# Patient Record
Sex: MALE | Race: WHITE | NOT HISPANIC OR LATINO
[De-identification: names, ages, dates, MRNs, and addresses within clinical notes are randomized per-mention and may not be internally consistent; named-entity substitution may affect disease eponyms.]

---

## 2017-01-10 ENCOUNTER — APPOINTMENT (OUTPATIENT)
Dept: SURGERY | Facility: CLINIC | Age: 52
End: 2017-01-10

## 2017-01-10 VITALS
HEIGHT: 67 IN | DIASTOLIC BLOOD PRESSURE: 87 MMHG | BODY MASS INDEX: 43.47 KG/M2 | WEIGHT: 277 LBS | SYSTOLIC BLOOD PRESSURE: 144 MMHG

## 2017-01-10 DIAGNOSIS — Z78.9 OTHER SPECIFIED HEALTH STATUS: ICD-10-CM

## 2017-02-14 ENCOUNTER — APPOINTMENT (OUTPATIENT)
Dept: GASTROENTEROLOGY | Facility: CLINIC | Age: 52
End: 2017-02-14

## 2017-02-14 VITALS
WEIGHT: 271 LBS | HEIGHT: 67 IN | DIASTOLIC BLOOD PRESSURE: 76 MMHG | OXYGEN SATURATION: 97 % | BODY MASS INDEX: 42.53 KG/M2 | HEART RATE: 75 BPM | SYSTOLIC BLOOD PRESSURE: 112 MMHG

## 2017-04-21 ENCOUNTER — APPOINTMENT (OUTPATIENT)
Dept: GASTROENTEROLOGY | Facility: HOSPITAL | Age: 52
End: 2017-04-21

## 2017-08-29 ENCOUNTER — APPOINTMENT (OUTPATIENT)
Dept: GASTROENTEROLOGY | Facility: CLINIC | Age: 52
End: 2017-08-29

## 2018-01-12 ENCOUNTER — APPOINTMENT (OUTPATIENT)
Dept: GASTROENTEROLOGY | Facility: CLINIC | Age: 53
End: 2018-01-12
Payer: COMMERCIAL

## 2018-01-12 VITALS
WEIGHT: 268 LBS | SYSTOLIC BLOOD PRESSURE: 130 MMHG | TEMPERATURE: 98.2 F | HEIGHT: 67 IN | DIASTOLIC BLOOD PRESSURE: 80 MMHG | RESPIRATION RATE: 97 BRPM | HEART RATE: 64 BPM | BODY MASS INDEX: 42.06 KG/M2

## 2018-01-12 DIAGNOSIS — R10.12 LEFT UPPER QUADRANT PAIN: ICD-10-CM

## 2018-01-12 DIAGNOSIS — R13.10 DYSPHAGIA, UNSPECIFIED: ICD-10-CM

## 2018-01-12 DIAGNOSIS — M62.08 SEPARATION OF MUSCLE (NONTRAUMATIC), OTHER SITE: ICD-10-CM

## 2018-01-12 PROCEDURE — 99214 OFFICE O/P EST MOD 30 MIN: CPT

## 2018-01-12 RX ORDER — METOPROLOL SUCCINATE 25 MG/1
25 TABLET, EXTENDED RELEASE ORAL
Qty: 30 | Refills: 0 | Status: DISCONTINUED | COMMUNITY
Start: 2016-12-28 | End: 2018-01-12

## 2018-01-25 ENCOUNTER — TRANSCRIPTION ENCOUNTER (OUTPATIENT)
Age: 53
End: 2018-01-25

## 2018-01-25 ENCOUNTER — RESULT REVIEW (OUTPATIENT)
Age: 53
End: 2018-01-25

## 2018-01-25 ENCOUNTER — OUTPATIENT (OUTPATIENT)
Dept: OUTPATIENT SERVICES | Facility: HOSPITAL | Age: 53
LOS: 1 days | End: 2018-01-25
Payer: COMMERCIAL

## 2018-01-25 ENCOUNTER — APPOINTMENT (OUTPATIENT)
Dept: GASTROENTEROLOGY | Facility: HOSPITAL | Age: 53
End: 2018-01-25

## 2018-01-25 DIAGNOSIS — Z98.89 OTHER SPECIFIED POSTPROCEDURAL STATES: Chronic | ICD-10-CM

## 2018-01-25 DIAGNOSIS — Z90.49 ACQUIRED ABSENCE OF OTHER SPECIFIED PARTS OF DIGESTIVE TRACT: Chronic | ICD-10-CM

## 2018-01-25 DIAGNOSIS — R10.12 LEFT UPPER QUADRANT PAIN: ICD-10-CM

## 2018-01-25 DIAGNOSIS — R13.10 DYSPHAGIA, UNSPECIFIED: ICD-10-CM

## 2018-01-25 DIAGNOSIS — K29.70 GASTRITIS, UNSPECIFIED, W/OUT BLEEDING: ICD-10-CM

## 2018-01-25 DIAGNOSIS — Z96.641 PRESENCE OF RIGHT ARTIFICIAL HIP JOINT: Chronic | ICD-10-CM

## 2018-01-25 PROCEDURE — 43239 EGD BIOPSY SINGLE/MULTIPLE: CPT

## 2018-01-25 PROCEDURE — 88312 SPECIAL STAINS GROUP 1: CPT

## 2018-01-25 PROCEDURE — 88313 SPECIAL STAINS GROUP 2: CPT | Mod: 26

## 2018-01-25 PROCEDURE — 88313 SPECIAL STAINS GROUP 2: CPT

## 2018-01-25 PROCEDURE — 88305 TISSUE EXAM BY PATHOLOGIST: CPT | Mod: 26

## 2018-01-25 PROCEDURE — 88305 TISSUE EXAM BY PATHOLOGIST: CPT

## 2018-01-25 PROCEDURE — 88312 SPECIAL STAINS GROUP 1: CPT | Mod: 26

## 2018-03-31 ENCOUNTER — TRANSCRIPTION ENCOUNTER (OUTPATIENT)
Age: 53
End: 2018-03-31

## 2019-12-26 ENCOUNTER — TRANSCRIPTION ENCOUNTER (OUTPATIENT)
Age: 54
End: 2019-12-26

## 2020-01-14 ENCOUNTER — APPOINTMENT (OUTPATIENT)
Dept: GASTROENTEROLOGY | Facility: CLINIC | Age: 55
End: 2020-01-14
Payer: COMMERCIAL

## 2020-01-14 DIAGNOSIS — K26.9 DUODENAL ULCER, UNSPECIFIED AS ACUTE OR CHRONIC, W/OUT HEMORRHAGE OR PERFORATION: ICD-10-CM

## 2020-01-14 DIAGNOSIS — Z79.1 LONG TERM (CURRENT) USE OF NON-STEROIDAL ANTI-INFLAMMATORIES (NSAID): ICD-10-CM

## 2020-01-14 PROCEDURE — 99214 OFFICE O/P EST MOD 30 MIN: CPT

## 2020-01-14 NOTE — PHYSICAL EXAM
[General Appearance - Alert] : alert [General Appearance - In No Acute Distress] : in no acute distress [Sclera] : the sclera and conjunctiva were normal [Extraocular Movements] : extraocular movements were intact [Outer Ear] : the ears and nose were normal in appearance [Neck Appearance] : the appearance of the neck was normal [Hearing Threshold Finger Rub Not [place]] : hearing was normal [Neck Cervical Mass (___cm)] : no neck mass was observed [Auscultation Breath Sounds / Voice Sounds] : lungs were clear to auscultation bilaterally [Heart Sounds] : normal S1 and S2 [Heart Rate And Rhythm] : heart rate was normal and rhythm regular [Heart Sounds Gallop] : no gallops [Heart Sounds Pericardial Friction Rub] : no pericardial rub [Murmurs] : no murmurs [Bowel Sounds] : normal bowel sounds [Abdomen Soft] : soft [Abdomen Tenderness] : non-tender [Abdomen Mass (___ Cm)] : no abdominal mass palpated [Abnormal Walk] : normal gait [Nail Clubbing] : no clubbing  or cyanosis of the fingernails [] : no rash [Skin Color & Pigmentation] : normal skin color and pigmentation [Oriented To Time, Place, And Person] : oriented to person, place, and time [Affect] : the affect was normal [Impaired Insight] : insight and judgment were intact

## 2020-01-15 PROBLEM — K26.9 DUODENAL ULCER: Status: ACTIVE | Noted: 2020-01-15

## 2020-01-15 PROBLEM — Z79.1 NSAID LONG-TERM USE: Status: ACTIVE | Noted: 2020-01-15

## 2020-01-15 NOTE — HISTORY OF PRESENT ILLNESS
[de-identified] : 53 y/o man with hx of obesity, HTN, hyperlipidemia who presents for follow up. \par \par He reports because of stress at work, he is having more heartburn.  This has been occurring maybe twice or 3 times a week.  He has been taking NSAIDs for toe pain.  He decided to change his eating habits and this has helped him.\par \par He denies any other symptoms such as nausea, vomiting, dysphagia, odynophagia, loss of appetite, abnormal weight loss, constipation, diarrhea, melena and hematochezia.\par \par He is due for a colonoscopy.\par \par \par \par From prior notes: \par In November 2016 patient had a colonoscopy and was found to have a diminutive polyp in the rectum which was removed, 7 mm polyp in the sigmoid colon which was removed, 1 cm semi-pedunculated polyp in left colon which was removed by hot snare polypectomy, 7 mm polyp in the transverse colon which was removed.  Throughout the colon there was moderate diverticulosis.\par Left colon polyp was a sessile serrated adenoma. He was reported to have another colonoscopy in 3 years.\par \par \par \par From Jan 2018 visit: \par The patient is concerned about a "bulge" that he experiences in the left upper quadrant area. He says when he coughs he notices a "bulging" in LUQ that can be very painful to him. At one point it was "black and blue" in color. He has recently seen a surgeon who told him he does not have a hernia however he wants a second opinion.\par \par He continues to report having trouble swallowing mainly to breads but says it is getting better.\par \par He has not had an upper endoscopy is recommended previously. \par  \par \par On January 2018 the patient had an upper endoscopy. He was found to have a normal squamocolumnar junction status post biopsy, moderate erythema in the antrum and body of the stomach this was biopsied and  mild erythema in the duodenum with thickened folds status post biopsy. Duodenum biopsies showed acute and chronic peptic ulcer. Gastric biopsies were unremarkable. No evidence of H. pylori bacteria. GE junction biopsies revealed mild inflammation negative for intestinal metaplasia.

## 2020-01-15 NOTE — ASSESSMENT
[FreeTextEntry1] : 55 y/o man with hx of obesity, HTN, hyperlipidemia who with reflux.   Patient is due for a colonoscopy since he has a history of colon polyps.  Risks such as perforation requiring surgery, bleeding, infection, diverticulitis, colitis, missed colon cancer (2% to 6%), internal organ injury, etc, risks of bowel prep including colitis, syncope, adverse reaction to medication etc. and risks of anesthesia including cardiopulmonary compromise were discussed with patient.  Patient verbalized understanding and agrees to proceed with the planned procedure.\par \par Patient was found to have an ulcer in the duodenum in 2018.  He's been having more reflux symptoms recently he would like to have another endoscopy at the time of his colonoscopy to assess his upper digestive tract. We can do this for him to rule out Howell's esophagus, ulcers etc.   Risks, benefits, and alternatives of the procedure were discussed with the patient. Patient understands and agrees to proceed with the planned procedure.\par \par He will need cardiology clearance first.

## 2020-09-15 ENCOUNTER — TRANSCRIPTION ENCOUNTER (OUTPATIENT)
Age: 55
End: 2020-09-15

## 2020-10-02 ENCOUNTER — APPOINTMENT (OUTPATIENT)
Dept: GASTROENTEROLOGY | Facility: CLINIC | Age: 55
End: 2020-10-02
Payer: COMMERCIAL

## 2020-10-02 VITALS
SYSTOLIC BLOOD PRESSURE: 112 MMHG | WEIGHT: 265 LBS | OXYGEN SATURATION: 96 % | HEART RATE: 66 BPM | BODY MASS INDEX: 41.59 KG/M2 | DIASTOLIC BLOOD PRESSURE: 81 MMHG | HEIGHT: 67 IN

## 2020-10-02 PROCEDURE — 99214 OFFICE O/P EST MOD 30 MIN: CPT

## 2020-10-02 NOTE — PHYSICAL EXAM
[General Appearance - Alert] : alert [General Appearance - In No Acute Distress] : in no acute distress [Sclera] : the sclera and conjunctiva were normal [Extraocular Movements] : extraocular movements were intact [Outer Ear] : the ears and nose were normal in appearance [Hearing Threshold Finger Rub Not [place]] : hearing was normal [Neck Appearance] : the appearance of the neck was normal [Neck Cervical Mass (___cm)] : no neck mass was observed [Auscultation Breath Sounds / Voice Sounds] : lungs were clear to auscultation bilaterally [Heart Rate And Rhythm] : heart rate was normal and rhythm regular [Heart Sounds] : normal S1 and S2 [Heart Sounds Gallop] : no gallops [Murmurs] : no murmurs [Heart Sounds Pericardial Friction Rub] : no pericardial rub [Bowel Sounds] : normal bowel sounds [Abdomen Soft] : soft [Abdomen Tenderness] : non-tender [Abdomen Mass (___ Cm)] : no abdominal mass palpated [Cervical Lymph Nodes Enlarged Posterior Bilaterally] : posterior cervical [Cervical Lymph Nodes Enlarged Anterior Bilaterally] : anterior cervical [Supraclavicular Lymph Nodes Enlarged Bilaterally] : supraclavicular [Abnormal Walk] : normal gait [Nail Clubbing] : no clubbing  or cyanosis of the fingernails [Skin Color & Pigmentation] : normal skin color and pigmentation [] : no rash [Oriented To Time, Place, And Person] : oriented to person, place, and time [Impaired Insight] : insight and judgment were intact [Affect] : the affect was normal

## 2020-10-05 NOTE — HISTORY OF PRESENT ILLNESS
[de-identified] : 56 y/o man with Hx of obesity, sleep apnea, HTN, hyperlipidemia who presents for evaluation of colonoscopy. \par \par No heartburns since Feb 2020 - does not take medication for heartburn. \par \par No nausea, vomiting.  \par No dysphagia, no dysphagia. \par \par No melena and no hematochezia.\par \par No loss of appetite, abnormal weight loss.  \par \par No constipation or diarrhea.  \par \par He is concerned about an abdominal hernia - when "pulling on something" he will get a "spasm or cramp" in left side of his abdomen.  When taking a showering - getting the chills from shower he will get a spams in left side of abdomen, discomfort can last all day.  \par \par He denies having pain in umbilicus.   \par \par CT scan in 2017 showed rectus diastases with fat continue umbilical hernia. Diverticulosis.  Small nodules in lungs.\par \par All other review of systems are negative.  Denies cardiac symptoms.\par \par \par \par \par \par \par \par From prior notes: \par In November 2016 patient had a colonoscopy and was found to have a diminutive polyp in the rectum which was removed, 7 mm polyp in the sigmoid colon which was removed, 1 cm semipedunculated polyp in left colon which was removed by hot snare polypectomy, 7 mm polyp in the transverse colon which was removed. Throughout the colon there was moderate diverticulosis.\par Left colon polyp was a sessile serrated adenoma. He was reported to have another colonoscopy in 3 years.\par \par \par \par From Jan 2018 visit: \par The patient is concerned about a "bulge" that he experiences in the left upper quadrant area. He says when he coughs he notices a "bulging" in LUQ that can be very painful to him. At one point it was "black and blue" in color. He has recently seen a surgeon who told him he does not have a hernia however he wants a second opinion.\par \par He continues to report having trouble swallowing mainly to breads but says it is getting better.\par \par He has not had an upper endoscopy is recommended previously. \par  \par On January 2018 the patient had an upper endoscopy. He was found to have a normal squamocolumnar junction status post biopsy, moderate erythema in the antrum and body of the stomach this was biopsied and mild erythema in the duodenum with thickened folds status post biopsy. Duodenum biopsies showed acute and chronic peptic ulcer. Gastric biopsies were unremarkable. No evidence of H. pylori bacteria. GE junction biopsies revealed mild inflammation negative for intestinal metaplasia. \par  \par \par \par Jan 2020: \par \par He reports because of stress at work, he is having more heartburn. This has been occurring maybe twice or 3 times a week. He has been taking NSAIDs for toe pain. He decided to change his eating habits and this has helped him.\par \par He denies any other symptoms such as nausea, vomiting, dysphagia, odynophagia, loss of appetite, abnormal weight loss, constipation, diarrhea, melena and hematochezia.

## 2020-10-05 NOTE — ASSESSMENT
[FreeTextEntry1] : IMPRESSION:\par 1.  Precancerous colon polyps - largest one 1 cm in 2016 - recommended repeat exam in 3 years\par 2.  Moderate diverticulosis throughout colon noted in 2016 \par 3.  Rectus diastasis  - patient would like a second opinion - recommended Dr. Henriquez, Dr. Gifford \par \par \par \par PLAN. \par I have advised the patient to arrange for a colonoscopy to rule out colon polyps, colorectal cancer etc. under monitored anesthesia care.  Risks such as perforation  (4 in 10,000) requiring surgery, bleeding (8 in 10,000), infection, diverticulitis, colitis, missed colon cancer (2% to 6%), internal organ injury, etc, risks of bowel prep including colitis, syncope, adverse reaction to medication etc. and risks of anesthesia including cardiopulmonary compromise were discussed with patient.  Patient verbalized understanding and agrees to proceed with the planned procedure.\par \par Would need cardiology clearance.\par \par Counseled on diverticulosis - complications of diverticulitis - recommended him to contact us if severe abdominal pain, with fevers etc.  Advised Metamucil once a day with increased fluids.  \par \par Lung nodules seen in 2017 - patient follows with his PCP. \par \par Previously recommended an upper endoscopy on last visit in Jan 2020 - procedure recommended after his complaints of heartburn - but his heartburn resolved in Feb 2020 - he would like to hold off on repeat upper endoscopy.  Patient was advised if heartburn comes back to arrange for one at our office. Risk of an upper endoscopy have been discussed.

## 2020-12-19 ENCOUNTER — TRANSCRIPTION ENCOUNTER (OUTPATIENT)
Age: 55
End: 2020-12-19

## 2021-01-20 ENCOUNTER — APPOINTMENT (OUTPATIENT)
Dept: GASTROENTEROLOGY | Facility: HOSPITAL | Age: 56
End: 2021-01-20

## 2021-02-24 ENCOUNTER — NON-APPOINTMENT (OUTPATIENT)
Age: 56
End: 2021-02-24

## 2021-03-19 ENCOUNTER — APPOINTMENT (OUTPATIENT)
Dept: DISASTER EMERGENCY | Facility: CLINIC | Age: 56
End: 2021-03-19

## 2021-03-23 ENCOUNTER — APPOINTMENT (OUTPATIENT)
Dept: DISASTER EMERGENCY | Facility: CLINIC | Age: 56
End: 2021-03-23

## 2021-03-23 DIAGNOSIS — Z01.818 ENCOUNTER FOR OTHER PREPROCEDURAL EXAMINATION: ICD-10-CM

## 2021-03-24 LAB — SARS-COV-2 N GENE NPH QL NAA+PROBE: NOT DETECTED

## 2021-03-25 ENCOUNTER — TRANSCRIPTION ENCOUNTER (OUTPATIENT)
Age: 56
End: 2021-03-25

## 2021-03-26 ENCOUNTER — OUTPATIENT (OUTPATIENT)
Dept: OUTPATIENT SERVICES | Facility: HOSPITAL | Age: 56
LOS: 1 days | End: 2021-03-26
Payer: COMMERCIAL

## 2021-03-26 ENCOUNTER — RESULT REVIEW (OUTPATIENT)
Age: 56
End: 2021-03-26

## 2021-03-26 ENCOUNTER — APPOINTMENT (OUTPATIENT)
Dept: GASTROENTEROLOGY | Facility: HOSPITAL | Age: 56
End: 2021-03-26

## 2021-03-26 DIAGNOSIS — Z98.89 OTHER SPECIFIED POSTPROCEDURAL STATES: Chronic | ICD-10-CM

## 2021-03-26 DIAGNOSIS — K21.9 GASTRO-ESOPHAGEAL REFLUX DISEASE WITHOUT ESOPHAGITIS: ICD-10-CM

## 2021-03-26 DIAGNOSIS — K63.5 POLYP OF COLON: ICD-10-CM

## 2021-03-26 DIAGNOSIS — K21.9 GASTRO-ESOPHAGEAL REFLUX DISEASE W/OUT ESOPHAGITIS: ICD-10-CM

## 2021-03-26 DIAGNOSIS — Z96.641 PRESENCE OF RIGHT ARTIFICIAL HIP JOINT: Chronic | ICD-10-CM

## 2021-03-26 DIAGNOSIS — Z90.49 ACQUIRED ABSENCE OF OTHER SPECIFIED PARTS OF DIGESTIVE TRACT: Chronic | ICD-10-CM

## 2021-03-26 PROCEDURE — 43239 EGD BIOPSY SINGLE/MULTIPLE: CPT

## 2021-03-26 PROCEDURE — 88305 TISSUE EXAM BY PATHOLOGIST: CPT | Mod: 26

## 2021-03-26 PROCEDURE — 45381 COLONOSCOPY SUBMUCOUS NJX: CPT

## 2021-03-26 PROCEDURE — 88312 SPECIAL STAINS GROUP 1: CPT

## 2021-03-26 PROCEDURE — C1889: CPT

## 2021-03-26 PROCEDURE — 88312 SPECIAL STAINS GROUP 1: CPT | Mod: 26

## 2021-03-26 PROCEDURE — 45385 COLONOSCOPY W/LESION REMOVAL: CPT

## 2021-03-26 PROCEDURE — 88313 SPECIAL STAINS GROUP 2: CPT | Mod: 26

## 2021-03-26 PROCEDURE — 88313 SPECIAL STAINS GROUP 2: CPT

## 2021-03-26 PROCEDURE — 88305 TISSUE EXAM BY PATHOLOGIST: CPT

## 2021-03-26 RX ORDER — OMEPRAZOLE 40 MG/1
40 CAPSULE, DELAYED RELEASE ORAL
Qty: 30 | Refills: 3 | Status: ACTIVE | COMMUNITY
Start: 2021-03-26 | End: 1900-01-01

## 2021-04-12 ENCOUNTER — NON-APPOINTMENT (OUTPATIENT)
Age: 56
End: 2021-04-12

## 2021-05-18 ENCOUNTER — APPOINTMENT (OUTPATIENT)
Dept: DISASTER EMERGENCY | Facility: CLINIC | Age: 56
End: 2021-05-18

## 2021-05-19 LAB — SARS-COV-2 N GENE NPH QL NAA+PROBE: NOT DETECTED

## 2021-05-20 ENCOUNTER — TRANSCRIPTION ENCOUNTER (OUTPATIENT)
Age: 56
End: 2021-05-20

## 2021-05-21 ENCOUNTER — RESULT REVIEW (OUTPATIENT)
Age: 56
End: 2021-05-21

## 2021-05-21 ENCOUNTER — OUTPATIENT (OUTPATIENT)
Dept: OUTPATIENT SERVICES | Facility: HOSPITAL | Age: 56
LOS: 1 days | End: 2021-05-21
Payer: COMMERCIAL

## 2021-05-21 ENCOUNTER — APPOINTMENT (OUTPATIENT)
Dept: GASTROENTEROLOGY | Facility: HOSPITAL | Age: 56
End: 2021-05-21

## 2021-05-21 DIAGNOSIS — Z98.89 OTHER SPECIFIED POSTPROCEDURAL STATES: Chronic | ICD-10-CM

## 2021-05-21 DIAGNOSIS — Z96.641 PRESENCE OF RIGHT ARTIFICIAL HIP JOINT: Chronic | ICD-10-CM

## 2021-05-21 DIAGNOSIS — Z90.49 ACQUIRED ABSENCE OF OTHER SPECIFIED PARTS OF DIGESTIVE TRACT: Chronic | ICD-10-CM

## 2021-05-21 DIAGNOSIS — K63.5 POLYP OF COLON: ICD-10-CM

## 2021-05-21 PROCEDURE — 88305 TISSUE EXAM BY PATHOLOGIST: CPT | Mod: 26

## 2021-05-21 PROCEDURE — 45385 COLONOSCOPY W/LESION REMOVAL: CPT | Mod: PT

## 2021-05-21 PROCEDURE — 45380 COLONOSCOPY AND BIOPSY: CPT | Mod: 59

## 2021-05-21 PROCEDURE — 45385 COLONOSCOPY W/LESION REMOVAL: CPT

## 2021-05-21 PROCEDURE — C1889: CPT

## 2021-05-21 PROCEDURE — 88305 TISSUE EXAM BY PATHOLOGIST: CPT

## 2021-06-18 ENCOUNTER — APPOINTMENT (OUTPATIENT)
Dept: GASTROENTEROLOGY | Facility: CLINIC | Age: 56
End: 2021-06-18
Payer: COMMERCIAL

## 2021-06-18 PROCEDURE — 99072 ADDL SUPL MATRL&STAF TM PHE: CPT

## 2021-06-18 PROCEDURE — 99214 OFFICE O/P EST MOD 30 MIN: CPT

## 2021-06-18 RX ORDER — POLYETHYLENE GLYCOL 3350 AND ELECTROLYTES WITH LEMON FLAVOR 236; 22.74; 6.74; 5.86; 2.97 G/4L; G/4L; G/4L; G/4L; G/4L
236 POWDER, FOR SOLUTION ORAL
Qty: 1 | Refills: 0 | Status: ACTIVE | COMMUNITY
Start: 2021-06-18 | End: 1900-01-01

## 2021-06-18 NOTE — HISTORY OF PRESENT ILLNESS
[de-identified] : 54 y/o man with Hx of obesity, sleep apnea, HTN, hyperlipidemia \par \par \par Initial office visit 2/2016: \par In November 2016 patient had a colonoscopy and was found to have a diminutive polyp in the rectum which was removed, 7 mm polyp in the sigmoid colon which was removed, 1 cm semipedunculated polyp in left colon which was removed by hot snare polypectomy, 7 mm polyp in the transverse colon which was removed. Throughout the colon there was moderate diverticulosis.\par \par Left colon polyp was a sessile serrated adenoma. He was reported to have another colonoscopy in 3 years.\par \par \par Office visit in Jan 2018: \par The patient is concerned about a "bulge" that he experiences in the left upper quadrant area. He says when he coughs he notices a "bulging" in LUQ that can be very painful to him. At one point it was "black and blue" in color. He has recently seen a surgeon who told him he does not have a hernia however he wants a second opinion.\par \par He continues to report having trouble swallowing mainly to breads but says it is getting better.\par \par He has not had an upper endoscopy is recommended previously. \par  \par On January 2018 the patient had an upper endoscopy. He was found to have a normal squamocolumnar junction status post biopsy, moderate erythema in the antrum and body of the stomach this was biopsied and mild erythema in the duodenum with thickened folds status post biopsy. Duodenum biopsies showed acute and chronic peptic ulcer. Gastric biopsies were unremarkable. No evidence of H. pylori bacteria. GE junction biopsies revealed mild inflammation negative for intestinal metaplasia. \par  \par \par \par Jan 2020: \par He reports because of stress at work, he is having more heartburn. This has been occurring maybe twice or 3 times a week. He has been taking NSAIDs for toe pain. He decided to change his eating habits and this has helped him.\par \par \par Oct 2020 office visit: \par No heartburns since Feb 2020 - does not take medication for heartburn. \par \par He is concerned about an abdominal hernia - when "pulling on something" he will get a "spasm or cramp" in left side of his abdomen. When taking a showering - getting the chills from shower he will get a spams in left side of abdomen, discomfort can last all day. \par \par CT scan in 2017 showed rectus diastases with fat continue umbilical hernia. Diverticulosis. Small nodules in lungs.\par \par \par \par Discussion/Summary 2/2021: \par When drinking carbonated drinks, such as Gingerall sodas, - has a very tough with carbonation - gets a lot of pressure and pain in chest and has a hard time belching.\par \par If he avoids carbonated drinks he has not discomfort/pressure pain.\par \par Certain foods cucumber, green peppers, cilantro - "bad heartburn" - when avoid these he has no heartburn. \par \par Jan 2018 bx of the duodenum showed acute and chronic ulcer.\par \par Patient is concerned would like and endoscopy at time of colonoscopy - Since previously a duodenal ulcer was seen on biopsies in 2018, we could reevaluated his upper digestive tract to r/o ulcer disease, lesions, celiac disease etc. Risks of the procedures were again discussed including perforation, bleeding, risks of anesthesia etc - he will get cardiology clearance. \par EGD/colonoscopy. \par \par  \par EGD March 2021:  Normal GE junction s/p bx (reflux esophagitis).  Mild to moderate gastric erythema s/p bx of fundus and body of the stomach s/p bx (no pathology). Small nodule in descending duodenum s/p bx (peptic duodenitis).  \par \par Colonoscopy on March 2021: 9 mm transverse colon polyp removed (sessile serrated adenoma).  8 mm descending colon polyp removed (hyperplastic polyp).   2 large sigmoid colon polyps removed after ORISE lift by cold snare (sessile serrated adenoma).  Diffuse diverticulosis more so in the sigmoid colon.  Small internal hemorrhoids.   Adequate prep for polyps greater than 6 mm).  Repeat colonoscopy in 2 months. \par \par \par Discussion/Summary March 2021: \par Four colon polyps removed - two were large - spoke with patient and his wife afterwards - advised repeat colonoscopy with a two day bowel prep. \par \par \par Colonoscopy in May 2021:  Transverse colon polyp removed (hyperplastic polyp).  Two descending colon polyps removed (hyperplastic polyps).  Prior polypectomy site at 50 cm bx (no pathology).  8 polyps in the sigmoid colon removed (tubular adenoma, hyperplastic polyps).  Sigmoid diverticulosis.  There were other sessile polyps in the sigmoid colon that were not removed given the length of time.  All polyps measured less than 1 cm.  \par \par  \par

## 2021-06-18 NOTE — ASSESSMENT
[FreeTextEntry1] : IMPRESSION:\par #  Likely Sessile Serrated Polyposis Syndrome\par \par -  Colonoscopy 11/2016 by Dr. Chahal - Total of 4 colon polyps removed (one was large)\par Small rectal polyp removed (hyperplastic polyp), 7 mm sigmoid colon polyp removed (hyperplastic polyp).  1 cm semipedunculated left colon polyp removed (hyperplastic polyp with features of sessile serrated adenoma), 7 mm transverse colon polyp removed (tubular adenoma).  Diverticulosis.  Repeat in 3 years.  \par \par -  Colonoscopy on March 2021: Total of 4 colon polyps removed \par 9 mm transverse colon polyp removed (sessile serrated adenoma). 8 mm descending colon polyp removed (hyperplastic polyp). 2 large sigmoid colon polyps removed after ORISE lift by cold snare (sessile serrated adenoma). Diffuse diverticulosis more so in the sigmoid colon. Small internal hemorrhoids. Adequate prep for polyps greater than 6 mm). Repeat colonoscopy in 2 months\par \par -  Colonoscopy in May 2021: Total of 11 colon polyps were removed all less than 1 cm. \par Transverse colon polyp removed (hyperplastic polyp). Two descending colon polyps removed (hyperplastic polyps). Prior polypectomy site at 50 cm bx (no pathology). 8 polyps in the sigmoid colon removed (tubular adenoma, hyperplastic polyps). Sigmoid diverticulosis. There were other sessile polyps in the sigmoid colon that were not removed given the length of time. All polyps measured less than 1 cm. \par \par #  Reflux symptoms in past \par -  EGD 1/2018 Mild reflux on bx - no BE.   Duodenum biopsies showed acute and chronic peptic ulcer negative for H. pylori bacteria. \par \par -  EGD March 2021: Mild reflux on bx. Small nodule in descending duodenum s/p bx (peptic duodenitis). \par \par #  Previously concerned about a hernia\par -  CT scan in 2017 showed rectus diastases with fat continue umbilical hernia. Diverticulosis. Small nodules in lungs. Patient follows with PCP. \par \par \par \par \par \par PLAN. \par I had a long discussion with patient. We reviewed sessile serrated polyposis syndrome.  Discussed with patient that in order to make a diagnosis of serrated polyposis syndrome, he would need to have 5 serrated polyps proximal to the rectum with two of the polyps greater than 1 cm in size.  \par \par I discussed that patients with serrated polyposis syndrome are at a high risk of colon cancer. He was made aware that patients who undergo surveillance programs may have lesser risk for colon cancer - 5 year risk of colon cancer under surveillance maybe 7%. \par \par The indications for total colectomy for patients who have sessile serrated polyposis was reviewed with the patient and this would include finding colon cancer, having overwhelming number of sessile serrated adenomas, or patient preference not to undergo further surveillance colonoscopies and undergo a total colectomy instead. \par \par Risks and benefits of surveillance colonoscopies was discussed with the patient.\par \par Risks of missed lesions/colon cancer during surveillance was also reviewed with the patient. \par \par He was recommended to undergo a surveillance colonoscopy.  His next colonoscopy due date will be in 2 months - because of his job he would like to have one schedule first week of Sept - he will arrange today with our staff. If no further colon polyps are found - he would need to consider a repeat colonoscopy in one to three years. \par \par First degree relatives in patients with sessile serrated adenomas may need to have surveillance colonoscopies every 3 to 5 years if no polyps are discovered. If serrated lesions are encountered they too may need surveillance colonoscopies every 1 to 3 years. \par \par He has one child - he was counseled that his child, should start at 40 for first colonoscopy. \par \par Since his bowel prep was rated as fair on last exam in May 2021 even after a 2 day bowel prep, I discussed taking GoLYTELY for two days - 4 liters on day #1, and 4 liters on day # 2 - side effects reviewed - advised adequate hydration.\par \par Discussed seeing a genetic counselor - contact information given.  \par \par Mr Gordillo verbalized understanding of above information provided to him - written instructions given.  He left office calm.

## 2021-08-05 DIAGNOSIS — K63.5 POLYP OF COLON: ICD-10-CM

## 2021-08-05 RX ORDER — POLYETHYLENE GLYCOL 3350 AND ELECTROLYTES WITH LEMON FLAVOR 236; 22.74; 6.74; 5.86; 2.97 G/4L; G/4L; G/4L; G/4L; G/4L
236 POWDER, FOR SOLUTION ORAL
Qty: 1 | Refills: 0 | Status: ACTIVE | COMMUNITY
Start: 2021-08-05 | End: 1900-01-01

## 2021-09-27 ENCOUNTER — APPOINTMENT (OUTPATIENT)
Dept: DISASTER EMERGENCY | Facility: CLINIC | Age: 56
End: 2021-09-27

## 2021-09-30 ENCOUNTER — APPOINTMENT (OUTPATIENT)
Dept: GASTROENTEROLOGY | Facility: HOSPITAL | Age: 56
End: 2021-09-30

## 2023-05-22 ENCOUNTER — APPOINTMENT (OUTPATIENT)
Dept: ORTHOPEDIC SURGERY | Facility: CLINIC | Age: 58
End: 2023-05-22
Payer: OTHER MISCELLANEOUS

## 2023-05-22 VITALS — BODY MASS INDEX: 43 KG/M2 | HEIGHT: 67 IN | WEIGHT: 274 LBS

## 2023-05-22 DIAGNOSIS — Z98.890 OTHER SPECIFIED POSTPROCEDURAL STATES: ICD-10-CM

## 2023-05-22 DIAGNOSIS — M23.91 UNSPECIFIED INTERNAL DERANGEMENT OF RIGHT KNEE: ICD-10-CM

## 2023-05-22 DIAGNOSIS — M23.92 UNSPECIFIED INTERNAL DERANGEMENT OF LEFT KNEE: ICD-10-CM

## 2023-05-22 DIAGNOSIS — E66.01 MORBID (SEVERE) OBESITY DUE TO EXCESS CALORIES: ICD-10-CM

## 2023-05-22 PROCEDURE — 99204 OFFICE O/P NEW MOD 45 MIN: CPT

## 2023-05-22 PROCEDURE — 73564 X-RAY EXAM KNEE 4 OR MORE: CPT | Mod: 50

## 2023-05-22 NOTE — ASSESSMENT
[FreeTextEntry1] : FALL AT WORK WITH BILAT KNEE INJURY (LEFT > RIGHT ); RIGHT KNEE PRIOR SCOPE LIKELY OA EXACERBATION; LEFT KNEE CONCERN FOR MMT, XRAYS UNREMARKABLE TODAY; ADVISED MRI\par DISCUSSED POSSIBILITY OF B/L CSI  AT NEXT VISIT IF INDICATED FROM PAIN AND MRI RESULTS.\par RTO AFTER MRI FOR RESULTS. \par CURRENTLY WFD\par

## 2023-05-22 NOTE — IMAGING
[Right] : right knee [Moderate tricompartmental OA lateral narrowing] : Moderate tricompartmental OA lateral narrowing [Mild patellofemoral OA] : Mild patellofemoral OA [Left] : left knee [All Views] : anteroposterior, lateral, skyline, and anteroposterior standing [de-identified] : LEFT KNEE \par MOD effusion, no warmth, no ecchymosis, no erythema.\par No tenderness to palpation, no palpable defects.\par Range of motion 0-110 with pain\par 5/5 quadriceps and hamstring strength\par Negative Lachman, +Sha, negative anterior drawer, negative posterior drawer, negative patella apprehension; no extensor lag: no varus or valgus instability.\par Motor and sensory intact distally\par Negative Airam\par Non-antalgic gait\par \par RIGHT KNEE \par No effusion, no warmth, no ecchymosis, no erythema.\par No tenderness to palpation, no palpable defects.\par Range of motion 0-120 without pain\par 5/5 quadriceps and hamstring strength\par Negative Lachman, negative Sha, negative anterior drawer, negative posterior drawer, negative patella apprehension; no extensor lag: no varus or valgus instability.\par Motor and sensory intact distally\par Negative Airam\par Non-antalgic gait\par  [There are no fractures, subluxations or dislocations. No significant abnormalities are seen] : There are no fractures, subluxations or dislocations. No significant abnormalities are seen

## 2023-05-22 NOTE — HISTORY OF PRESENT ILLNESS
[Right Leg] : right leg [Work related] : work related [Gradual] : gradual [7] : 7 [0] : 0 [Localized] : localized [Sharp] : sharp [Intermittent] : intermittent [Household chores] : household chores [Rest] : rest [Ice] : ice [Walking] : walking [Bending forward] : bending forward [] : yes [Full time] : Work status: full time [de-identified] : WC DOI: 5/12/23\par OCCUPATION:  FOR [employer]\par \par 05/22/2023: PATIENT PRESENTS FOR B/L KNEE PAIN S/P FALL AT WORK ON 5/12/23. PATIENT WAS TAKING PICTURES AND BACKED UP AND HIS LEFT LEG WENT INTO A  HOLE AND REST OF BODY FELL FORWARD, HYPEREXTENDING HIS LEFT KNEE. PATIENT HAS BEEN UTILIZING A CANE TO HELP WITH AMBULATION. PATIENT DESCRIBES PAIN AS SHARP WITH MOVEMENT, DULL/ACHY WTH SITTING (L>R). PATIENT HAS BEEN TAKING ADVIL AND ICE WITH MILD RELIEF. PATIENT HAS NOTICED INCREASE SWELLING ON B/L  KNEES, MOSTLY ON THE SUPERIOR ASPECT. PATIENT STATES HE HAS HAD CLICKING AND INSTABILITY WITH INTERMITTENT BUCKLING ON BOTH KNEES, WORSE ON LEFT. DENIES PRIOR SURGERY OR INJURY TO L KNEE. PATIENT IS CURRENTLY WORKING FULL DUTY. PATIENT IS CURRENTLY WEARING A KNEE SLEEVE ON B/L KNEES WHEN ACTIVE. \par \par PATIENT HAS H/O RIGHT KNEE ARTHROSCOPY (2009) AND RIGHT EDDIE (NOVEMBER 2011) [de-identified] : NOTHING  [de-identified] :

## 2023-05-25 ENCOUNTER — FORM ENCOUNTER (OUTPATIENT)
Age: 58
End: 2023-05-25

## 2023-05-26 ENCOUNTER — APPOINTMENT (OUTPATIENT)
Dept: MRI IMAGING | Facility: CLINIC | Age: 58
End: 2023-05-26

## 2023-05-26 ENCOUNTER — APPOINTMENT (OUTPATIENT)
Dept: MRI IMAGING | Facility: CLINIC | Age: 58
End: 2023-05-26
Payer: OTHER MISCELLANEOUS

## 2023-05-26 PROCEDURE — 73721 MRI JNT OF LWR EXTRE W/O DYE: CPT | Mod: LT

## 2023-06-02 ENCOUNTER — APPOINTMENT (OUTPATIENT)
Dept: ORTHOPEDIC SURGERY | Facility: CLINIC | Age: 58
End: 2023-06-02
Payer: OTHER MISCELLANEOUS

## 2023-06-02 ENCOUNTER — NON-APPOINTMENT (OUTPATIENT)
Age: 58
End: 2023-06-02

## 2023-06-02 VITALS — HEIGHT: 67 IN | WEIGHT: 274 LBS | BODY MASS INDEX: 43 KG/M2

## 2023-06-02 PROCEDURE — 99214 OFFICE O/P EST MOD 30 MIN: CPT

## 2023-06-02 NOTE — HISTORY OF PRESENT ILLNESS
[Right Leg] : right leg [Work related] : work related [Gradual] : gradual [6] : 6 [2] : 2 [Localized] : localized [Sharp] : sharp [Rest] : rest [Full time] : Work status: full time [de-identified] : WC DOI: 5/12/23\par OCCUPATION:  FOR [employer]\par \par 05/22/2023: PATIENT PRESENTS FOR B/L KNEE PAIN S/P FALL AT WORK ON 5/12/23. PATIENT WAS TAKING PICTURES AND BACKED UP AND HIS LEFT LEG WENT INTO A  HOLE AND REST OF BODY FELL FORWARD, HYPEREXTENDING HIS LEFT KNEE. PATIENT HAS BEEN UTILIZING A CANE TO HELP WITH AMBULATION. PATIENT DESCRIBES PAIN AS SHARP WITH MOVEMENT, DULL/ACHY WTH SITTING (L>R). PATIENT HAS BEEN TAKING ADVIL AND ICE WITH MILD RELIEF. PATIENT HAS NOTICED INCREASE SWELLING ON B/L  KNEES, MOSTLY ON THE SUPERIOR ASPECT. PATIENT STATES HE HAS HAD CLICKING AND INSTABILITY WITH INTERMITTENT BUCKLING ON BOTH KNEES, WORSE ON LEFT. DENIES PRIOR SURGERY OR INJURY TO L KNEE. PATIENT IS CURRENTLY WORKING FULL DUTY. PATIENT IS CURRENTLY WEARING A KNEE SLEEVE ON B/L KNEES WHEN ACTIVE. \par \par PATIENT HAS H/O RIGHT KNEE ARTHROSCOPY (2009) AND RIGHT EDDIE (NOVEMBER 2011) [] : Post Surgical Visit: no [FreeTextEntry1] : R Knee [FreeTextEntry3] : 5/12/23 [FreeTextEntry5] : 56 y/o RHD M presents s/p MRI due to a work injury when he stepped in a drain at work on above DOI. 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

## 2023-06-02 NOTE — IMAGING
[de-identified] : LEFT KNEE \par MOD effusion, no warmth, no ecchymosis, no erythema.\par No tenderness to palpation, no palpable defects.\par Range of motion 0-110 with pain\par 5/5 quadriceps and hamstring strength\par Negative Lachman, +Sha, negative anterior drawer, negative posterior drawer, negative patella apprehension; no extensor lag: no varus or valgus instability.\par Motor and sensory intact distally\par Negative Airam\par Non-antalgic gait\par \par RIGHT KNEE \par No effusion, no warmth, no ecchymosis, no erythema.\par No tenderness to palpation, no palpable defects.\par Range of motion 0-120 without pain\par 5/5 quadriceps and hamstring strength\par Negative Lachman, negative Sha, negative anterior drawer, negative posterior drawer, negative patella apprehension; no extensor lag: no varus or valgus instability.\par Motor and sensory intact distally\par Negative Airam\par Non-antalgic gait\par

## 2023-06-02 NOTE — ASSESSMENT
[FreeTextEntry1] : Reviewed MRI left knee in detail.  Traumatic lateral meniscal tear with associated osteochondral defect in the lateral femoral condyle.  I do not see any obvious loose bodies therefore we do long discussion about his treatment options.  Reasonable to start with conservative measures.  Course of physical therapy and plan for possible corticosteroid injection next visit he if he still symptomatic.  Briefly discussed arthroscopic partial lateral meniscectomy and possible microfracture for the osteochondral defect if all else fails.  Currently working full duty.  The right knee again seems to be an arthritis exacerbation we also discussed a cortisone shot on that side next visit if needed

## 2023-08-04 ENCOUNTER — APPOINTMENT (OUTPATIENT)
Dept: ORTHOPEDIC SURGERY | Facility: CLINIC | Age: 58
End: 2023-08-04
Payer: OTHER MISCELLANEOUS

## 2023-08-04 VITALS — WEIGHT: 272 LBS | BODY MASS INDEX: 41.22 KG/M2 | HEIGHT: 68 IN

## 2023-08-04 PROCEDURE — 99214 OFFICE O/P EST MOD 30 MIN: CPT

## 2023-08-04 NOTE — IMAGING
[de-identified] : LEFT KNEE  Mild effusion, no warmth, no ecchymosis, no erythema. No tenderness to palpation, no palpable defects. Range of motion 0-120 with pain 5/5 quadriceps and hamstring strength Negative Lachman, +Sha, negative anterior drawer, negative posterior drawer, negative patella apprehension; no extensor lag: no varus or valgus instability. Motor and sensory intact distally Negative Airam Non-antalgic gait  RIGHT KNEE  Mild effusion, no warmth, no ecchymosis, no erythema. No tenderness to palpation, no palpable defects. Range of motion 0-110 without pain 5/5 quadriceps and hamstring strength Negative Lachman, negative Sha, negative anterior drawer, negative posterior drawer, negative patella apprehension; no extensor lag: no varus or valgus instability. Motor and sensory intact distally Negative Airam Non-antalgic gait

## 2023-08-04 NOTE — WORK
[Mild Partial] : mild partial [No Rx restrictions] : No Rx restrictions. [Can return to work without limitations on ______] : can return to work without limitations on [unfilled] [I provided the services listed above] :  I provided the services listed above.

## 2023-08-04 NOTE — HISTORY OF PRESENT ILLNESS
[Right Leg] : right leg [Work related] : work related [Gradual] : gradual [3] : 3 [2] : 2 [Localized] : localized [Sharp] : sharp [Rest] : rest [Full time] : Work status: full time [de-identified] : WC DOI: 5/12/23\par  OCCUPATION:  FOR [employer]\par  \par  05/22/2023: PATIENT PRESENTS FOR B/L KNEE PAIN S/P FALL AT WORK ON 5/12/23. PATIENT WAS TAKING PICTURES AND BACKED UP AND HIS LEFT LEG WENT INTO A  HOLE AND REST OF BODY FELL FORWARD, HYPEREXTENDING HIS LEFT KNEE. PATIENT HAS BEEN UTILIZING A CANE TO HELP WITH AMBULATION. PATIENT DESCRIBES PAIN AS SHARP WITH MOVEMENT, DULL/ACHY WTH SITTING (L>R). PATIENT HAS BEEN TAKING ADVIL AND ICE WITH MILD RELIEF. PATIENT HAS NOTICED INCREASE SWELLING ON B/L  KNEES, MOSTLY ON THE SUPERIOR ASPECT. PATIENT STATES HE HAS HAD CLICKING AND INSTABILITY WITH INTERMITTENT BUCKLING ON BOTH KNEES, WORSE ON LEFT. DENIES PRIOR SURGERY OR INJURY TO L KNEE. PATIENT IS CURRENTLY WORKING FULL DUTY. PATIENT IS CURRENTLY WEARING A KNEE SLEEVE ON B/L KNEES WHEN ACTIVE. \par  \par  PATIENT HAS H/O RIGHT KNEE ARTHROSCOPY (2009) AND RIGHT EDDIE (NOVEMBER 2011) [FreeTextEntry1] : R Knee [] : Post Surgical Visit: no [FreeTextEntry3] : 5/12/23 [FreeTextEntry5] : 56 y/o RHD M presents s/p MRI due to a work injury when he stepped in a drain at work on above DOI. 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

## 2023-08-04 NOTE — ASSESSMENT
[FreeTextEntry1] : RIGHT KNEE ABIT MORE SWOLLEN TODAY, WENT OVER OPTIONS START PT DISCUSSED ASP/INJ TO ONE OR BOTH KNEES NEXT TIME [facility]

## 2023-08-09 ENCOUNTER — APPOINTMENT (OUTPATIENT)
Dept: ORTHOPEDIC SURGERY | Facility: CLINIC | Age: 58
End: 2023-08-09
Payer: OTHER MISCELLANEOUS

## 2023-08-09 VITALS — HEIGHT: 68 IN | WEIGHT: 272 LBS | BODY MASS INDEX: 41.22 KG/M2

## 2023-08-09 DIAGNOSIS — M93.262 OSTEOCHONDRITIS DISSECANS, LEFT KNEE: ICD-10-CM

## 2023-08-09 PROCEDURE — 99213 OFFICE O/P EST LOW 20 MIN: CPT

## 2023-08-09 NOTE — HISTORY OF PRESENT ILLNESS
[Work related] : work related [Sharp] : sharp [Household chores] : household chores [Leisure] : leisure [Work] : work [Sleep] : sleep [Walking] : walking [Stairs] : stairs [Full time] : Work status: full time [] : no [FreeTextEntry1] : Bilateral knees [FreeTextEntry3] : 5/12/13 [FreeTextEntry5] : Patient states he is here for a PT script for bilateral knees for the WC case and a note for work. Patient states L knee is giving him trouble to walk, going up and down the stairs. Uses a cane for support when needed [FreeTextEntry9] : Icy hot

## 2023-08-09 NOTE — IMAGING
[de-identified] : LEFT KNEE  Mild effusion, no warmth, no ecchymosis, no erythema. No tenderness to palpation, no palpable defects. Range of motion 0-120 with pain 5/5 quadriceps and hamstring strength Negative Lachman, +Sha, negative anterior drawer, negative posterior drawer, negative patella apprehension; no extensor lag: no varus or valgus instability. Motor and sensory intact distally Negative Airam Non-antalgic gait  RIGHT KNEE  Mild effusion, no warmth, no ecchymosis, no erythema. No tenderness to palpation, no palpable defects. Range of motion 0-110 without pain 5/5 quadriceps and hamstring strength Negative Lachman, negative Sha, negative anterior drawer, negative posterior drawer, negative patella apprehension; no extensor lag: no varus or valgus instability. Motor and sensory intact distally Negative Airam Non-antalgic gait

## 2023-09-22 ENCOUNTER — APPOINTMENT (OUTPATIENT)
Dept: ORTHOPEDIC SURGERY | Facility: CLINIC | Age: 58
End: 2023-09-22
Payer: OTHER MISCELLANEOUS

## 2023-09-22 VITALS — WEIGHT: 272 LBS | HEIGHT: 68 IN | BODY MASS INDEX: 41.22 KG/M2

## 2023-09-22 PROCEDURE — 99214 OFFICE O/P EST MOD 30 MIN: CPT | Mod: 25

## 2023-09-22 PROCEDURE — 20611 DRAIN/INJ JOINT/BURSA W/US: CPT | Mod: 50

## 2023-09-22 PROCEDURE — J3490M: CUSTOM

## 2023-11-03 ENCOUNTER — APPOINTMENT (OUTPATIENT)
Dept: ORTHOPEDIC SURGERY | Facility: CLINIC | Age: 58
End: 2023-11-03
Payer: OTHER MISCELLANEOUS

## 2023-11-03 VITALS — HEIGHT: 68 IN | WEIGHT: 272 LBS | BODY MASS INDEX: 41.22 KG/M2

## 2023-11-03 PROCEDURE — 20611 DRAIN/INJ JOINT/BURSA W/US: CPT | Mod: 50

## 2023-11-03 PROCEDURE — 99213 OFFICE O/P EST LOW 20 MIN: CPT | Mod: 25

## 2023-11-10 ENCOUNTER — APPOINTMENT (OUTPATIENT)
Dept: ORTHOPEDIC SURGERY | Facility: CLINIC | Age: 58
End: 2023-11-10
Payer: OTHER MISCELLANEOUS

## 2023-11-10 VITALS — HEIGHT: 68 IN | WEIGHT: 272 LBS | BODY MASS INDEX: 41.22 KG/M2

## 2023-11-10 PROCEDURE — 20611 DRAIN/INJ JOINT/BURSA W/US: CPT | Mod: 50

## 2023-11-10 PROCEDURE — 99212 OFFICE O/P EST SF 10 MIN: CPT | Mod: 25

## 2023-11-22 ENCOUNTER — APPOINTMENT (OUTPATIENT)
Dept: ORTHOPEDIC SURGERY | Facility: CLINIC | Age: 58
End: 2023-11-22
Payer: OTHER MISCELLANEOUS

## 2023-11-22 VITALS — HEIGHT: 68 IN | BODY MASS INDEX: 41.22 KG/M2 | WEIGHT: 272 LBS

## 2023-11-22 PROCEDURE — 20611 DRAIN/INJ JOINT/BURSA W/US: CPT | Mod: 50

## 2023-11-22 PROCEDURE — 99213 OFFICE O/P EST LOW 20 MIN: CPT | Mod: 25

## 2023-12-15 ENCOUNTER — APPOINTMENT (OUTPATIENT)
Dept: ORTHOPEDIC SURGERY | Facility: CLINIC | Age: 58
End: 2023-12-15
Payer: COMMERCIAL

## 2023-12-15 VITALS — WEIGHT: 280 LBS | BODY MASS INDEX: 42.44 KG/M2 | HEIGHT: 68 IN

## 2023-12-15 PROCEDURE — 20550 NJX 1 TENDON SHEATH/LIGAMENT: CPT | Mod: F7

## 2023-12-15 PROCEDURE — 99214 OFFICE O/P EST MOD 30 MIN: CPT | Mod: 25

## 2023-12-15 NOTE — DISCUSSION/SUMMARY
[de-identified] : Discussed the nature of the diagnosis and risk and benefits of different modalities of treatment. Discussed conservative management vs CSI.  Pt would like a CSI in the RT middle finger.  Done today and tolerated well. All questions answered.

## 2023-12-15 NOTE — PROCEDURE
[Tendon Sheath] : tendon sheath [Right] : of the right [3rd] : 3rd trigger finger [Pain] : pain [Inflammation] : inflammation [Alcohol] : alcohol [Ethyl Chloride sprayed topically] : ethyl chloride sprayed topically [Sterile technique used] : sterile technique used [___ cc    1%] : Lidocaine ~Vcc of 1%  [___ cc    10mg] : Triamcinolone (Kenalog) ~Vcc of 10 mg  [] : Patient tolerated procedure well [Risks, benefits, alternatives discussed / Verbal consent obtained] : the risks benefits, and alternatives have been discussed, and verbal consent was obtained

## 2024-01-05 ENCOUNTER — APPOINTMENT (OUTPATIENT)
Dept: ORTHOPEDIC SURGERY | Facility: CLINIC | Age: 59
End: 2024-01-05
Payer: OTHER MISCELLANEOUS

## 2024-01-05 VITALS — BODY MASS INDEX: 42.44 KG/M2 | WEIGHT: 280 LBS | HEIGHT: 68 IN

## 2024-01-05 PROCEDURE — 99214 OFFICE O/P EST MOD 30 MIN: CPT

## 2024-01-05 NOTE — HISTORY OF PRESENT ILLNESS
[6] : 6 [2] : 2 [Dull/Aching] : dull/aching [Sharp] : sharp [Retired] : Work status: retired [3] : 3 [Euflexxa] : Euflexxa [de-identified] : 9/22/23: here to follow up on bilateral knees. Currently doing PT. Left has been worse. Swelling improved. Will still notice discomfort with prolonged standing/rest - stiffness. Takes Advil and uses Voltaren cream.   11/3/23: here to follow up on bilateral knees. CSI from 9/22/23 gave relief, but then aggravated after twisting the knee while on a ladder and feeling a pop. Had swelling. Paused PT due to recent pain. Improvement since.   11/10/23: Here to receive his 2nd Euflexxa Injection to both knees.   01/05/2024: here for a follow up for BL knees. States no significant change since last visit. Ging to PT. retired  [FreeTextEntry1] : BL knees [] : no

## 2024-01-05 NOTE — ASSESSMENT
[FreeTextEntry1] : He reports only partial relief from the viscosupplementation series to both knees.  The left side is the one that remains the most symptomatic with intermittent swelling and occasional mechanical symptoms.  I do believe he is a good candidate for arthroscopic partial lateral meniscectomy given the failure of conservative measures including multiple corticosteroid injections and meds. he wants to try the full 6-week course of physical therapy before proceeding with surgery.  He is currently retired.

## 2024-01-05 NOTE — IMAGING
[de-identified] : Left knee Mild effusion, no warmth, no ecchymosis Lateral joint line tenderness to palpation Range of motion 0-130 5/5 quadriceps and hamstring strength Positive Wyatt No varus or valgus instability, negative lachman Motor and sensory intact distally Mildly antalgic gait

## 2024-01-12 ENCOUNTER — APPOINTMENT (OUTPATIENT)
Dept: ORTHOPEDIC SURGERY | Facility: CLINIC | Age: 59
End: 2024-01-12

## 2024-02-16 ENCOUNTER — APPOINTMENT (OUTPATIENT)
Dept: ORTHOPEDIC SURGERY | Facility: CLINIC | Age: 59
End: 2024-02-16
Payer: OTHER MISCELLANEOUS

## 2024-02-16 PROCEDURE — 99214 OFFICE O/P EST MOD 30 MIN: CPT

## 2024-02-16 NOTE — ASSESSMENT
[FreeTextEntry1] : He reports relief from physical therapy. He would like to continue with this plan and defer surgery at this time. He will follow up in 3 months. He is currently retired but recently started working a part time job.

## 2024-02-16 NOTE — HISTORY OF PRESENT ILLNESS
[6] : 6 [2] : 2 [Dull/Aching] : dull/aching [Sharp] : sharp [Retired] : Work status: retired [3] : 3 [Euflexxa] : Euflexxa [de-identified] : 9/22/23: here to follow up on bilateral knees. Currently doing PT. Left has been worse. Swelling improved. Will still notice discomfort with prolonged standing/rest - stiffness. Takes Advil and uses Voltaren cream.   11/3/23: here to follow up on bilateral knees. CSI from 9/22/23 gave relief, but then aggravated after twisting the knee while on a ladder and feeling a pop. Had swelling. Paused PT due to recent pain. Improvement since.   11/10/23: Here to receive his 2nd Euflexxa Injection to both knees.   01/05/2024: here for a follow up for BL knees. States no significant change since last visit. Ging to PT. retired   2/16/24: here to follow up on bilateral knees. Attending PT with improvement. Still experiences stiffness with prolonged walking.  [] : no [FreeTextEntry1] : BL knees

## 2024-02-16 NOTE — WORK
[No Rx restrictions] : No Rx restrictions. [I provided the services listed above] :  I provided the services listed above. [FreeTextEntry1] : retired

## 2024-02-16 NOTE — IMAGING
[de-identified] : Left knee Mild effusion, no warmth, no ecchymosis Lateral joint line tenderness to palpation Range of motion 0-130 5/5 quadriceps and hamstring strength Positive Wyatt No varus or valgus instability, negative lachman Motor and sensory intact distally Mildly antalgic gait

## 2024-05-10 ENCOUNTER — APPOINTMENT (OUTPATIENT)
Dept: ORTHOPEDIC SURGERY | Facility: CLINIC | Age: 59
End: 2024-05-10
Payer: COMMERCIAL

## 2024-05-10 DIAGNOSIS — M65.331 TRIGGER FINGER, RIGHT MIDDLE FINGER: ICD-10-CM

## 2024-05-10 DIAGNOSIS — M65.332 TRIGGER FINGER, LEFT MIDDLE FINGER: ICD-10-CM

## 2024-05-10 PROCEDURE — 20550 NJX 1 TENDON SHEATH/LIGAMENT: CPT | Mod: 50

## 2024-05-10 NOTE — DISCUSSION/SUMMARY
[de-identified] : Discussed the nature of the diagnosis and risk and benefits of different modalities of treatment. Discussed conservative management vs CSI.  Pt would like another RT middle TF CSI. This is #2. He would also like a LT Middle TF CSI. #1.   Done today and tolerated well. All questions answered.

## 2024-05-10 NOTE — PROCEDURE
[FreeTextEntry3] : Tendon sheath was performed of the right 3rd trigger finger. The indication for this procedure was pain and inflammation. The site was prepped with alcohol, ethyl chloride sprayed topically, and sterile technique used. An injection of Lidocaine 0.5cc of 1%, Triamcinolone (Kenalog) 0.5cc of 10 mg was used. Patient tolerated procedure well.   The risks, benefits, and alternatives have been discussed, and verbal consent was obtained.   The risks, benefits, alternatives, and contents of the injection have been discussed.  Risks include but are not limited to allergic reaction, flare reaction, bruising, injection site pain, numbness, increased blood sugar, permanent white skin discoloration at the injection site and infection.  The patient understands the risks and agrees to having the injection.  All questions have been answered. Patient agrees to injection.  Tendon sheath was performed of the left 3rd trigger finger. The indication for this procedure was pain and inflammation. The site was prepped with alcohol, ethyl chloride sprayed topically, and sterile technique used. An injection of Lidocaine 0.5cc of 1%, Triamcinolone (Kenalog) 0.5cc of 10 mg was used. Patient tolerated procedure well.   The risks, benefits, and alternatives have been discussed, and verbal consent was obtained.   The risks, benefits, alternatives, and contents of the injection have been discussed.  Risks include but are not limited to allergic reaction, flare reaction, bruising, injection site pain, numbness, increased blood sugar, permanent white skin discoloration at the injection site and infection.  The patient understands the risks and agrees to having the injection.  All questions have been answered. Patient agrees to injection.

## 2024-05-10 NOTE — HISTORY OF PRESENT ILLNESS
[Gradual] : gradual [Dull/Aching] : dull/aching [Radiating] : radiating [Sharp] : sharp [Tightness] : tightness [Constant] : constant [de-identified] : 58 year old male followed for b/l middle trigger fingers. He was given a RT middle trigger finger CSI in December 2023. WIth reoccurance. Lt middle finger with mild symptoms.   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 pain in the past 2 months has gotten worse. Cannot close right hand without pain. Right hand dominant

## 2024-05-17 ENCOUNTER — APPOINTMENT (OUTPATIENT)
Dept: ORTHOPEDIC SURGERY | Facility: CLINIC | Age: 59
End: 2024-05-17
Payer: OTHER MISCELLANEOUS

## 2024-05-17 VITALS — HEIGHT: 68 IN | BODY MASS INDEX: 42.44 KG/M2 | WEIGHT: 280 LBS

## 2024-05-17 DIAGNOSIS — S83.272D COMPLEX TEAR OF LATERAL MENISCUS, CURRENT INJURY, LEFT KNEE, SUBSEQUENT ENCOUNTER: ICD-10-CM

## 2024-05-17 PROCEDURE — 99214 OFFICE O/P EST MOD 30 MIN: CPT

## 2024-05-17 NOTE — ASSESSMENT
[FreeTextEntry1] : Euflexxa injections have provided the best relief for the b/l knee pain. He completed 11/22/23 with relief for 6 months. Discussed a comprehensive treatment plan today targeted at decreasing pain, inflammation, and funtionality. He is currently taking advil as needed so he defers meloxicam today. HEP provided. Offered CSI today but patient would like to hold off until euflexxa injections are approved. Kindly request euflexxa injections for both knees to be started after 5/22/24. RTC once injections are approved.

## 2024-05-17 NOTE — HISTORY OF PRESENT ILLNESS
[6] : 6 [2] : 2 [Dull/Aching] : dull/aching [Sharp] : sharp [Retired] : Work status: retired [3] : 3 [Euflexxa] : Euflexxa [de-identified] : 9/22/23: here to follow up on bilateral knees. Currently doing PT. Left has been worse. Swelling improved. Will still notice discomfort with prolonged standing/rest - stiffness. Takes Advil and uses Voltaren cream.   11/3/23: here to follow up on bilateral knees. CSI from 9/22/23 gave relief, but then aggravated after twisting the knee while on a ladder and feeling a pop. Had swelling. Paused PT due to recent pain. Improvement since.   11/10/23: Here to receive his 2nd Euflexxa Injection to both knees.   01/05/2024: here for a follow up for BL knees. States no significant change since last visit. Ging to PT. retired   2/16/24: here to follow up on bilateral knees. Attending PT with improvement. Still experiences stiffness with prolonged walking.   5/16/24: here to follow up on bilateral knees. Finished PT. Completed Euflexxa series on 11/22/23 with relief until 5/2024. Inquiring about repeat. Left is worse than right. Worsens with walking.  [] : no [FreeTextEntry1] : BL knees

## 2024-05-17 NOTE — IMAGING
[de-identified] : B/L Knee Exam: Inspection: No effusion, no warmth, no ecchymosis Palpation: Medial joint line tenderness to palpation, negative Airam Range of motion: 0-130 with mild anterior crepitus Strength: 5/5 quadriceps and hamstring strength Stability: Ligamentously stable Motor and sensory intact distally Gait: Non antalgic gait

## 2024-05-31 ENCOUNTER — APPOINTMENT (OUTPATIENT)
Dept: ORTHOPEDIC SURGERY | Facility: CLINIC | Age: 59
End: 2024-05-31
Payer: OTHER MISCELLANEOUS

## 2024-05-31 VITALS — WEIGHT: 280 LBS | BODY MASS INDEX: 42.44 KG/M2 | HEIGHT: 68 IN

## 2024-05-31 PROCEDURE — 20611 DRAIN/INJ JOINT/BURSA W/US: CPT | Mod: 50

## 2024-05-31 NOTE — HISTORY OF PRESENT ILLNESS
[6] : 6 [2] : 2 [Dull/Aching] : dull/aching [Sharp] : sharp [Retired] : Work status: retired [3] : 3 [Euflexxa] : Euflexxa [de-identified] : 9/22/23: here to follow up on bilateral knees. Currently doing PT. Left has been worse. Swelling improved. Will still notice discomfort with prolonged standing/rest - stiffness. Takes Advil and uses Voltaren cream.   11/3/23: here to follow up on bilateral knees. CSI from 9/22/23 gave relief, but then aggravated after twisting the knee while on a ladder and feeling a pop. Had swelling. Paused PT due to recent pain. Improvement since.   11/10/23: Here to receive his 2nd Euflexxa Injection to both knees.   01/05/2024: here for a follow up for BL knees. States no significant change since last visit. Ging to PT. retired   2/16/24: here to follow up on bilateral knees. Attending PT with improvement. Still experiences stiffness with prolonged walking.   5/16/24: here to follow up on bilateral knees. Finished PT. Completed Euflexxa series on 11/22/23 with relief until 5/2024. Inquiring about repeat. Left is worse than right. Worsens with walking.   5/31/24: here for bilateral knee Euflexxa #1.  [] : no [FreeTextEntry1] : BL knees

## 2024-05-31 NOTE — PROCEDURE
[FreeTextEntry3] : Large joint injection was performed of the __BILATERAL_ knee.  The indication for this procedure was pain, inflammation and x-ray evidence of Osteoarthritis on this or prior visit. The site was prepped with alcohol, betadine, ethyl chloride sprayed topically and sterile technique used.  An injection of EUFLEXXA 2ml, series #__1_ was used.   Patient was advised to call if redness, pain or fever occur, apply ice for 15 minutes out of every hour for the next 12-24 hours as tolerated and patient was advised to rest the joint(s) for 2 days. Patient has tried OTC's including aspirin, Ibuprofen, Aleve, etc or prescription NSAIDS, and/or exercises at home and/or physical therapy without satisfactory response, patient had decreased mobility in the joint and the risks benefits, and alternatives have been discussed, and verbal consent was obtained.  Ultrasound guidance was indicated for this patient due to prior failure or difficult injection. All ultrasound images have been permanently captured and stored accordingly in our picture archiving and communication system. Visualization of the needle and placement of injection was performed without complication.

## 2024-05-31 NOTE — ASSESSMENT
[FreeTextEntry1] : Euflexxa #1 bilateral knees today, tolerated well 45cc clear, straw-colored fluid asp from L knee  Ice to affected area RTC 1 week to continue

## 2024-05-31 NOTE — IMAGING
[de-identified] : B/L Knee Exam: Inspection: No effusion, no warmth, no ecchymosis Palpation: Medial joint line tenderness to palpation, negative Airam Range of motion: 0-130 with mild anterior crepitus Strength: 5/5 quadriceps and hamstring strength Stability: Ligamentously stable Motor and sensory intact distally Gait: Non antalgic gait

## 2024-06-07 ENCOUNTER — APPOINTMENT (OUTPATIENT)
Dept: ORTHOPEDIC SURGERY | Facility: CLINIC | Age: 59
End: 2024-06-07
Payer: OTHER MISCELLANEOUS

## 2024-06-07 VITALS — WEIGHT: 280 LBS | HEIGHT: 68 IN | BODY MASS INDEX: 42.44 KG/M2

## 2024-06-07 PROCEDURE — 99212 OFFICE O/P EST SF 10 MIN: CPT | Mod: 25

## 2024-06-07 PROCEDURE — 20611 DRAIN/INJ JOINT/BURSA W/US: CPT | Mod: 50

## 2024-06-07 NOTE — PROCEDURE
[FreeTextEntry3] : Large joint injection was performed of the __BILATERAL_ knee.  The indication for this procedure was pain, inflammation and x-ray evidence of Osteoarthritis on this or prior visit. The site was prepped with alcohol, betadine, ethyl chloride sprayed topically and sterile technique used.  An injection of EUFLEXXA 2ml, series #2 was used.   Patient was advised to call if redness, pain or fever occur, apply ice for 15 minutes out of every hour for the next 12-24 hours as tolerated and patient was advised to rest the joint(s) for 2 days. Patient has tried OTC's including aspirin, Ibuprofen, Aleve, etc or prescription NSAIDS, and/or exercises at home and/or physical therapy without satisfactory response, patient had decreased mobility in the joint and the risks benefits, and alternatives have been discussed, and verbal consent was obtained.  Ultrasound guidance was indicated for this patient due to prior failure or difficult injection. All ultrasound images have been permanently captured and stored accordingly in our picture archiving and communication system. Visualization of the needle and placement of injection was performed without complication.

## 2024-06-07 NOTE — HISTORY OF PRESENT ILLNESS
[6] : 6 [2] : 2 [Dull/Aching] : dull/aching [Sharp] : sharp [Retired] : Work status: retired [3] : 3 [Euflexxa] : Euflexxa [de-identified] : 9/22/23: here to follow up on bilateral knees. Currently doing PT. Left has been worse. Swelling improved. Will still notice discomfort with prolonged standing/rest - stiffness. Takes Advil and uses Voltaren cream.   11/3/23: here to follow up on bilateral knees. CSI from 9/22/23 gave relief, but then aggravated after twisting the knee while on a ladder and feeling a pop. Had swelling. Paused PT due to recent pain. Improvement since.   11/10/23: Here to receive his 2nd Euflexxa Injection to both knees.   01/05/2024: here for a follow up for BL knees. States no significant change since last visit. Ging to PT. retired   2/16/24: here to follow up on bilateral knees. Attending PT with improvement. Still experiences stiffness with prolonged walking.   5/16/24: here to follow up on bilateral knees. Finished PT. Completed Euflexxa series on 11/22/23 with relief until 5/2024. Inquiring about repeat. Left is worse than right. Worsens with walking.   5/31/24: here for bilateral knee Euflexxa #1.  6/7/24: here for bilateral knee Euflexxa #2.  [] : no [FreeTextEntry1] : BL knees

## 2024-06-07 NOTE — IMAGING
[de-identified] : B/L Knee Exam: Inspection: No effusion, no warmth, no ecchymosis Palpation: Medial joint line tenderness to palpation, negative Airam Range of motion: 0-130 with mild anterior crepitus Strength: 5/5 quadriceps and hamstring strength Stability: Ligamentously stable Motor and sensory intact distally Gait: Non antalgic gait

## 2024-06-07 NOTE — ASSESSMENT
[FreeTextEntry1] : Euflexxa #2 bilateral knees today, tolerated well. Aspirated 15cc NSF from left knee prior to injection. RTC 1 week.

## 2024-06-14 ENCOUNTER — APPOINTMENT (OUTPATIENT)
Dept: ORTHOPEDIC SURGERY | Facility: CLINIC | Age: 59
End: 2024-06-14

## 2024-06-17 ENCOUNTER — APPOINTMENT (OUTPATIENT)
Dept: ORTHOPEDIC SURGERY | Facility: CLINIC | Age: 59
End: 2024-06-17

## 2024-06-17 DIAGNOSIS — M17.12 UNILATERAL PRIMARY OSTEOARTHRITIS, LEFT KNEE: ICD-10-CM

## 2024-06-17 DIAGNOSIS — M17.11 UNILATERAL PRIMARY OSTEOARTHRITIS, RIGHT KNEE: ICD-10-CM

## 2024-06-17 PROCEDURE — 20611 DRAIN/INJ JOINT/BURSA W/US: CPT | Mod: 50

## 2024-06-17 NOTE — IMAGING
[de-identified] : B/L Knee Exam: Inspection: No effusion, no warmth, no ecchymosis Palpation: Medial joint line tenderness to palpation, negative Airam Range of motion: 0-130 with mild anterior crepitus Strength: 5/5 quadriceps and hamstring strength Stability: Ligamentously stable Motor and sensory intact distally Gait: Non antalgic gait

## 2024-06-17 NOTE — HISTORY OF PRESENT ILLNESS
[6] : 6 [2] : 2 [Dull/Aching] : dull/aching [Sharp] : sharp [Retired] : Work status: retired [3] : 3 [Euflexxa] : Euflexxa [de-identified] : 9/22/23: here to follow up on bilateral knees. Currently doing PT. Left has been worse. Swelling improved. Will still notice discomfort with prolonged standing/rest - stiffness. Takes Advil and uses Voltaren cream.   11/3/23: here to follow up on bilateral knees. CSI from 9/22/23 gave relief, but then aggravated after twisting the knee while on a ladder and feeling a pop. Had swelling. Paused PT due to recent pain. Improvement since.   11/10/23: Here to receive his 2nd Euflexxa Injection to both knees.   01/05/2024: here for a follow up for BL knees. States no significant change since last visit. Ging to PT. retired   2/16/24: here to follow up on bilateral knees. Attending PT with improvement. Still experiences stiffness with prolonged walking.   5/16/24: here to follow up on bilateral knees. Finished PT. Completed Euflexxa series on 11/22/23 with relief until 5/2024. Inquiring about repeat. Left is worse than right. Worsens with walking.   5/31/24: here for bilateral knee Euflexxa #1.  6/7/24: here for bilateral knee Euflexxa #2.   6.17.24 here for bilateral knee Euflexxa #3. [] : no [FreeTextEntry1] : BL knees

## 2024-06-17 NOTE — PROCEDURE
[FreeTextEntry3] : Large joint injection was performed of the __BILATERAL_ knee.  The indication for this procedure was pain, inflammation and x-ray evidence of Osteoarthritis on this or prior visit. The site was prepped with alcohol, betadine, ethyl chloride sprayed topically and sterile technique used.  An injection of EUFLEXXA 2ml, series #3 was used.   Patient was advised to call if redness, pain or fever occur, apply ice for 15 minutes out of every hour for the next 12-24 hours as tolerated and patient was advised to rest the joint(s) for 2 days. Patient has tried OTC's including aspirin, Ibuprofen, Aleve, etc or prescription NSAIDS, and/or exercises at home and/or physical therapy without satisfactory response, patient had decreased mobility in the joint and the risks benefits, and alternatives have been discussed, and verbal consent was obtained.  Ultrasound guidance was indicated for this patient due to prior failure or difficult injection. All ultrasound images have been permanently captured and stored accordingly in our picture archiving and communication system. Visualization of the needle and placement of injection was performed without complication.

## 2024-06-17 NOTE — ASSESSMENT
[FreeTextEntry1] : Euflexxa #3 bilateral knees today, tolerated well. Discussed timing of repeat injections - on or after 12/18/24. RTC as needed.   Progress note completed by Iraida Dorantes PA-C under the supervision of Dr. Cespedes

## 2024-09-06 ENCOUNTER — APPOINTMENT (OUTPATIENT)
Dept: ORTHOPEDIC SURGERY | Facility: CLINIC | Age: 59
End: 2024-09-06
Payer: OTHER MISCELLANEOUS

## 2024-09-06 VITALS — WEIGHT: 280 LBS | BODY MASS INDEX: 42.44 KG/M2 | HEIGHT: 68 IN

## 2024-09-06 DIAGNOSIS — M93.262 OSTEOCHONDRITIS DISSECANS, LEFT KNEE: ICD-10-CM

## 2024-09-06 DIAGNOSIS — S83.272D COMPLEX TEAR OF LATERAL MENISCUS, CURRENT INJURY, LEFT KNEE, SUBSEQUENT ENCOUNTER: ICD-10-CM

## 2024-09-06 PROCEDURE — 99214 OFFICE O/P EST MOD 30 MIN: CPT | Mod: 25

## 2024-09-06 PROCEDURE — 20611 DRAIN/INJ JOINT/BURSA W/US: CPT | Mod: LT

## 2024-09-06 PROCEDURE — J3490M: CUSTOM

## 2024-09-06 NOTE — HISTORY OF PRESENT ILLNESS
[6] : 6 [2] : 2 [Dull/Aching] : dull/aching [Sharp] : sharp [Retired] : Work status: retired [3] : 3 [Euflexxa] : Euflexxa [de-identified] : 9/22/23: here to follow up on bilateral knees. Currently doing PT. Left has been worse. Swelling improved. Will still notice discomfort with prolonged standing/rest - stiffness. Takes Advil and uses Voltaren cream.   11/3/23: here to follow up on bilateral knees. CSI from 9/22/23 gave relief, but then aggravated after twisting the knee while on a ladder and feeling a pop. Had swelling. Paused PT due to recent pain. Improvement since.   11/10/23: Here to receive his 2nd Euflexxa Injection to both knees.   01/05/2024: here for a follow up for BL knees. States no significant change since last visit. Ging to PT. retired   2/16/24: here to follow up on bilateral knees. Attending PT with improvement. Still experiences stiffness with prolonged walking.   5/16/24: here to follow up on bilateral knees. Finished PT. Completed Euflexxa series on 11/22/23 with relief until 5/2024. Inquiring about repeat. Left is worse than right. Worsens with walking.   5/31/24: here for bilateral knee Euflexxa #1.  6/7/24: here for bilateral knee Euflexxa #2.   6.17.24 here for bilateral knee Euflexxa #3.  9/6/24: here to follow up on bilateral knee pain. Completed Euflexxa series on 6/17/24 with 8 weeks of relief. Notes recent swelling to left knee. Continued pain with bending/walking. 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  [] : no [FreeTextEntry1] : BL knees

## 2024-09-06 NOTE — ASSESSMENT
[FreeTextEntry1] : Laterally based symptoms as well as effusions persist despite viscosupplementation and corticosteroid injection.  Repeat aspiration and injection in his request but at this stage I do not think the knee is going to get much better than this.  We again discussed arthroscopic partial lateral meniscectomy and abrasion arthroplasty possible microfracture of the osteochondritis lesion over the lateral femoral condyle.  He wants to think about the surgical options and will contact me if he decides to schedule.  Follow-up in 3 months to reassess.

## 2024-09-06 NOTE — IMAGING
[de-identified] : Knee Exam: Inspection: Mild effusion, no warmth, no ecchymosis Palpation: Lateral joint line tenderness to palpation Range of motion: 0-110 Strength: 5/5 quadriceps and hamstring strength Special testing: Positive Wyatt; No varus or valgus instability, negative lachman Motor and sensory intact distally Gait: Mildly antalgic gait

## 2024-09-06 NOTE — WORK
[Mild Partial] : mild partial [Can return to work with limitations on ______] : can return to work with limitations on [unfilled] [Climbing stairs/Ladders] : climbing stairs/ladders [Kneeling] : kneeling [No Rx restrictions] : No Rx restrictions. [I provided the services listed above] :  I provided the services listed above.

## 2024-11-29 ENCOUNTER — APPOINTMENT (OUTPATIENT)
Dept: ORTHOPEDIC SURGERY | Facility: CLINIC | Age: 59
End: 2024-11-29

## 2024-12-02 ENCOUNTER — APPOINTMENT (OUTPATIENT)
Dept: ORTHOPEDIC SURGERY | Facility: CLINIC | Age: 59
End: 2024-12-02
Payer: OTHER MISCELLANEOUS

## 2024-12-02 DIAGNOSIS — S83.272D COMPLEX TEAR OF LATERAL MENISCUS, CURRENT INJURY, LEFT KNEE, SUBSEQUENT ENCOUNTER: ICD-10-CM

## 2024-12-02 DIAGNOSIS — M93.262 OSTEOCHONDRITIS DISSECANS, LEFT KNEE: ICD-10-CM

## 2024-12-02 PROCEDURE — 99214 OFFICE O/P EST MOD 30 MIN: CPT

## 2024-12-23 ENCOUNTER — APPOINTMENT (OUTPATIENT)
Dept: MRI IMAGING | Facility: CLINIC | Age: 59
End: 2024-12-23
Payer: OTHER MISCELLANEOUS

## 2024-12-23 PROCEDURE — 73721 MRI JNT OF LWR EXTRE W/O DYE: CPT | Mod: LT

## 2024-12-30 ENCOUNTER — APPOINTMENT (OUTPATIENT)
Dept: ORTHOPEDIC SURGERY | Facility: CLINIC | Age: 59
End: 2024-12-30
Payer: OTHER MISCELLANEOUS

## 2024-12-30 VITALS — WEIGHT: 282 LBS | HEIGHT: 68 IN | BODY MASS INDEX: 42.74 KG/M2

## 2024-12-30 DIAGNOSIS — M17.12 UNILATERAL PRIMARY OSTEOARTHRITIS, LEFT KNEE: ICD-10-CM

## 2024-12-30 DIAGNOSIS — M17.11 UNILATERAL PRIMARY OSTEOARTHRITIS, RIGHT KNEE: ICD-10-CM

## 2024-12-30 PROCEDURE — 99214 OFFICE O/P EST MOD 30 MIN: CPT

## 2025-03-07 ENCOUNTER — APPOINTMENT (OUTPATIENT)
Dept: ORTHOPEDIC SURGERY | Facility: CLINIC | Age: 60
End: 2025-03-07
Payer: COMMERCIAL

## 2025-03-07 DIAGNOSIS — M65.331 TRIGGER FINGER, RIGHT MIDDLE FINGER: ICD-10-CM

## 2025-03-07 DIAGNOSIS — M65.332 TRIGGER FINGER, LEFT MIDDLE FINGER: ICD-10-CM

## 2025-03-07 DIAGNOSIS — M65.342 TRIGGER FINGER, LEFT RING FINGER: ICD-10-CM

## 2025-03-07 PROCEDURE — 20550 NJX 1 TENDON SHEATH/LIGAMENT: CPT | Mod: 50

## 2025-03-27 ENCOUNTER — NON-APPOINTMENT (OUTPATIENT)
Age: 60
End: 2025-03-27

## 2025-03-31 ENCOUNTER — APPOINTMENT (OUTPATIENT)
Dept: ORTHOPEDIC SURGERY | Facility: CLINIC | Age: 60
End: 2025-03-31
Payer: OTHER MISCELLANEOUS

## 2025-03-31 DIAGNOSIS — S83.272D COMPLEX TEAR OF LATERAL MENISCUS, CURRENT INJURY, LEFT KNEE, SUBSEQUENT ENCOUNTER: ICD-10-CM

## 2025-03-31 DIAGNOSIS — M17.11 UNILATERAL PRIMARY OSTEOARTHRITIS, RIGHT KNEE: ICD-10-CM

## 2025-03-31 PROCEDURE — 99214 OFFICE O/P EST MOD 30 MIN: CPT

## 2025-04-17 ENCOUNTER — APPOINTMENT (OUTPATIENT)
Dept: MRI IMAGING | Facility: CLINIC | Age: 60
End: 2025-04-17

## 2025-04-23 ENCOUNTER — APPOINTMENT (OUTPATIENT)
Dept: MRI IMAGING | Facility: CLINIC | Age: 60
End: 2025-04-23
Payer: OTHER MISCELLANEOUS

## 2025-04-23 PROCEDURE — 73721 MRI JNT OF LWR EXTRE W/O DYE: CPT | Mod: RT

## 2025-05-02 ENCOUNTER — APPOINTMENT (OUTPATIENT)
Dept: ORTHOPEDIC SURGERY | Facility: CLINIC | Age: 60
End: 2025-05-02
Payer: OTHER MISCELLANEOUS

## 2025-05-02 VITALS — BODY MASS INDEX: 42.74 KG/M2 | WEIGHT: 282 LBS | HEIGHT: 68 IN

## 2025-05-02 DIAGNOSIS — M17.11 UNILATERAL PRIMARY OSTEOARTHRITIS, RIGHT KNEE: ICD-10-CM

## 2025-05-02 DIAGNOSIS — M23.306 OTHER MENISCUS DERANGEMENTS, UNSPECIFIED MENISCUS, RIGHT KNEE: ICD-10-CM

## 2025-05-02 PROCEDURE — 99214 OFFICE O/P EST MOD 30 MIN: CPT

## 2025-05-13 ENCOUNTER — APPOINTMENT (OUTPATIENT)
Dept: ORTHOPEDIC SURGERY | Facility: CLINIC | Age: 60
End: 2025-05-13

## 2025-05-27 ENCOUNTER — APPOINTMENT (OUTPATIENT)
Dept: ORTHOPEDIC SURGERY | Facility: CLINIC | Age: 60
End: 2025-05-27
Payer: OTHER MISCELLANEOUS

## 2025-05-27 DIAGNOSIS — M17.11 UNILATERAL PRIMARY OSTEOARTHRITIS, RIGHT KNEE: ICD-10-CM

## 2025-05-27 PROCEDURE — 20610 DRAIN/INJ JOINT/BURSA W/O US: CPT | Mod: RT

## 2025-05-27 PROCEDURE — 99215 OFFICE O/P EST HI 40 MIN: CPT | Mod: 25

## 2025-05-27 RX ORDER — METHYLPREDNISOLONE 4 MG/1
4 TABLET ORAL
Qty: 1 | Refills: 1 | Status: ACTIVE | COMMUNITY
Start: 2025-05-27 | End: 1900-01-01

## 2025-06-17 ENCOUNTER — APPOINTMENT (OUTPATIENT)
Age: 60
End: 2025-06-17

## 2025-06-17 RX ORDER — OXYCODONE AND ACETAMINOPHEN 5; 325 MG/1; MG/1
5-325 TABLET ORAL
Qty: 10 | Refills: 0 | Status: ACTIVE | COMMUNITY
Start: 2025-06-17 | End: 1900-01-01

## 2025-06-23 ENCOUNTER — APPOINTMENT (OUTPATIENT)
Dept: ORTHOPEDIC SURGERY | Facility: CLINIC | Age: 60
End: 2025-06-23
Payer: OTHER MISCELLANEOUS

## 2025-06-23 PROCEDURE — 99024 POSTOP FOLLOW-UP VISIT: CPT

## 2025-06-30 ENCOUNTER — APPOINTMENT (OUTPATIENT)
Dept: ORTHOPEDIC SURGERY | Facility: CLINIC | Age: 60
End: 2025-06-30
Payer: COMMERCIAL

## 2025-06-30 VITALS — BODY MASS INDEX: 40.62 KG/M2 | HEIGHT: 68 IN | WEIGHT: 268 LBS

## 2025-06-30 PROCEDURE — 73010 X-RAY EXAM OF SHOULDER BLADE: CPT | Mod: RT

## 2025-06-30 PROCEDURE — 73030 X-RAY EXAM OF SHOULDER: CPT | Mod: RT

## 2025-06-30 PROCEDURE — 99214 OFFICE O/P EST MOD 30 MIN: CPT

## 2025-06-30 RX ORDER — METHYLPREDNISOLONE 4 MG/1
4 TABLET ORAL
Qty: 1 | Refills: 0 | Status: ACTIVE | COMMUNITY
Start: 2025-06-30 | End: 1900-01-01

## 2025-07-28 ENCOUNTER — APPOINTMENT (OUTPATIENT)
Dept: ORTHOPEDIC SURGERY | Facility: CLINIC | Age: 60
End: 2025-07-28
Payer: OTHER MISCELLANEOUS

## 2025-07-28 DIAGNOSIS — M17.12 UNILATERAL PRIMARY OSTEOARTHRITIS, LEFT KNEE: ICD-10-CM

## 2025-07-28 DIAGNOSIS — S83.272D COMPLEX TEAR OF LATERAL MENISCUS, CURRENT INJURY, LEFT KNEE, SUBSEQUENT ENCOUNTER: ICD-10-CM

## 2025-07-28 PROCEDURE — 99024 POSTOP FOLLOW-UP VISIT: CPT

## 2025-08-15 ENCOUNTER — APPOINTMENT (OUTPATIENT)
Dept: ORTHOPEDIC SURGERY | Facility: CLINIC | Age: 60
End: 2025-08-15
Payer: COMMERCIAL

## 2025-08-15 DIAGNOSIS — M75.31 CALCIFIC TENDINITIS OF RIGHT SHOULDER: ICD-10-CM

## 2025-08-15 PROCEDURE — ZZZZZ: CPT

## 2025-08-15 RX ORDER — MELOXICAM 15 MG/1
15 TABLET ORAL DAILY
Qty: 30 | Refills: 1 | Status: ACTIVE | COMMUNITY
Start: 2025-08-15 | End: 2025-10-14

## 2025-08-26 ENCOUNTER — APPOINTMENT (OUTPATIENT)
Dept: ORTHOPEDIC SURGERY | Facility: CLINIC | Age: 60
End: 2025-08-26

## 2025-08-26 DIAGNOSIS — M65.342 TRIGGER FINGER, LEFT RING FINGER: ICD-10-CM

## 2025-08-26 DIAGNOSIS — M65.332 TRIGGER FINGER, LEFT MIDDLE FINGER: ICD-10-CM

## 2025-08-26 DIAGNOSIS — M65.331 TRIGGER FINGER, RIGHT MIDDLE FINGER: ICD-10-CM

## 2025-08-26 PROCEDURE — 99213 OFFICE O/P EST LOW 20 MIN: CPT | Mod: 25

## 2025-08-26 PROCEDURE — 20550 NJX 1 TENDON SHEATH/LIGAMENT: CPT | Mod: F2,F7,F3
